# Patient Record
Sex: MALE | Race: WHITE | NOT HISPANIC OR LATINO | Employment: FULL TIME | URBAN - METROPOLITAN AREA
[De-identification: names, ages, dates, MRNs, and addresses within clinical notes are randomized per-mention and may not be internally consistent; named-entity substitution may affect disease eponyms.]

---

## 2024-03-03 PROCEDURE — 87186 SC STD MICRODIL/AGAR DIL: CPT

## 2024-03-03 PROCEDURE — 87086 URINE CULTURE/COLONY COUNT: CPT

## 2024-03-04 ENCOUNTER — LAB REQUISITION (OUTPATIENT)
Dept: LAB | Facility: HOSPITAL | Age: 54
End: 2024-03-04
Payer: COMMERCIAL

## 2024-03-04 DIAGNOSIS — N39.0 URINARY TRACT INFECTION, SITE NOT SPECIFIED: ICD-10-CM

## 2024-03-06 LAB — BACTERIA UR CULT: ABNORMAL

## 2024-03-07 ENCOUNTER — OFFICE VISIT (OUTPATIENT)
Dept: PRIMARY CARE | Facility: CLINIC | Age: 54
End: 2024-03-07
Payer: COMMERCIAL

## 2024-03-07 VITALS
OXYGEN SATURATION: 97 % | HEART RATE: 81 BPM | BODY MASS INDEX: 29.23 KG/M2 | SYSTOLIC BLOOD PRESSURE: 130 MMHG | DIASTOLIC BLOOD PRESSURE: 80 MMHG | TEMPERATURE: 97.6 F | WEIGHT: 200.8 LBS

## 2024-03-07 DIAGNOSIS — N39.0 URINARY TRACT INFECTION WITHOUT HEMATURIA, SITE UNSPECIFIED: Primary | ICD-10-CM

## 2024-03-07 PROCEDURE — 1036F TOBACCO NON-USER: CPT | Performed by: FAMILY MEDICINE

## 2024-03-07 PROCEDURE — 99213 OFFICE O/P EST LOW 20 MIN: CPT | Performed by: FAMILY MEDICINE

## 2024-03-07 RX ORDER — SULFAMETHOXAZOLE AND TRIMETHOPRIM 800; 160 MG/1; MG/1
1 TABLET ORAL 2 TIMES DAILY
Qty: 14 TABLET | Refills: 0 | Status: SHIPPED | OUTPATIENT
Start: 2024-03-07 | End: 2024-03-14

## 2024-03-07 RX ORDER — CIPROFLOXACIN 500 MG/1
500 TABLET ORAL 2 TIMES DAILY
COMMUNITY
Start: 2024-03-03 | End: 2024-03-13

## 2024-03-07 ASSESSMENT — PATIENT HEALTH QUESTIONNAIRE - PHQ9
1. LITTLE INTEREST OR PLEASURE IN DOING THINGS: NOT AT ALL
2. FEELING DOWN, DEPRESSED OR HOPELESS: NOT AT ALL
SUM OF ALL RESPONSES TO PHQ9 QUESTIONS 1 AND 2: 0

## 2024-03-07 ASSESSMENT — PAIN SCALES - GENERAL: PAINLEVEL: 0-NO PAIN

## 2024-03-07 NOTE — PROGRESS NOTES
Subjective   Patient ID: Brian Duane Keith is a 53 y.o. male who presents for Follow-up (Urgent care UTI 3-3-24).    HPI Here for follow-up to urgent care visit.  Patient was in her normal state of health up until about a week ago when he developed some urgency and frequency and pain with urination.  He went to an urgent care was told he had a urinary tract infection.  He was placed on Cipro.  He the medicine for 5 days continuously without without change in discomfort.  He is still having urgency and frequency.  The culture showed greater than 100,000 Proteus.    Review of Systems  Constitutional: Patient is negative for fever, fatigue, weight change.  HEENT: Patient is never change in vision, hearing, swallow.  Cardio: Patient is negative for chest pain, lower extremity edema.  Pulmonary: Patient is negative for cough, shortness of breath.  Genitourinary: Patient is positive for urgency and frequency.    Objective   /80   Pulse 81   Temp 36.4 °C (97.6 °F)   Wt 91.1 kg (200 lb 12.8 oz)   SpO2 97%   BMI 29.23 kg/m²     Physical Exam  General: Awake and alert no apparent distress.  HEENT: Moist oral mucosa no cervical lymphadenopathy.  Cardio: Heart S1-S2 no murmur rub or gallop.  Pulmonary: Lungs clear to auscultation bilaterally.  Assessment/Plan   Problem List Items Addressed This Visit    None  Visit Diagnoses         Codes    Urinary tract infection without hematuria, site unspecified    -  Primary needs better control.  Discontinue Cipro.  Begin TMP-SMX.  Patient to follow-up if no better in 5 days. N39.0    Relevant Medications    sulfamethoxazole-trimethoprim (Bactrim DS) 800-160 mg tablet

## 2025-05-16 ENCOUNTER — OFFICE VISIT (OUTPATIENT)
Dept: PRIMARY CARE | Facility: CLINIC | Age: 55
End: 2025-05-16
Payer: COMMERCIAL

## 2025-05-16 ENCOUNTER — TELEPHONE (OUTPATIENT)
Dept: PRIMARY CARE | Facility: CLINIC | Age: 55
End: 2025-05-16

## 2025-05-16 VITALS
SYSTOLIC BLOOD PRESSURE: 124 MMHG | DIASTOLIC BLOOD PRESSURE: 64 MMHG | BODY MASS INDEX: 28.63 KG/M2 | HEIGHT: 70 IN | WEIGHT: 200 LBS | OXYGEN SATURATION: 96 % | HEART RATE: 86 BPM | TEMPERATURE: 98 F

## 2025-05-16 DIAGNOSIS — J01.00 ACUTE NON-RECURRENT MAXILLARY SINUSITIS: Primary | ICD-10-CM

## 2025-05-16 DIAGNOSIS — Z13.220 LIPID SCREENING: ICD-10-CM

## 2025-05-16 DIAGNOSIS — Z00.00 WELL ADULT EXAM: ICD-10-CM

## 2025-05-16 DIAGNOSIS — R73.01 ABNORMAL FASTING GLUCOSE: ICD-10-CM

## 2025-05-16 PROBLEM — K21.9 GASTRO-ESOPHAGEAL REFLUX DISEASE WITHOUT ESOPHAGITIS: Status: ACTIVE | Noted: 2025-05-16

## 2025-05-16 PROBLEM — S36.113A LIVER LACERATION: Status: ACTIVE | Noted: 2018-11-29

## 2025-05-16 PROBLEM — R53.83 FATIGUE: Status: ACTIVE | Noted: 2019-06-21

## 2025-05-16 PROBLEM — K61.1 PERIRECTAL ABSCESS: Status: ACTIVE | Noted: 2021-04-12

## 2025-05-16 PROBLEM — M54.50 LOW BACK PAIN WITH RADIATION: Status: ACTIVE | Noted: 2020-08-10

## 2025-05-16 PROBLEM — R27.8 COORDINATION IMPAIRMENTS: Status: ACTIVE | Noted: 2019-04-03

## 2025-05-16 PROBLEM — M25.579 ARTHRALGIA OF ANKLE: Status: ACTIVE | Noted: 2022-07-05

## 2025-05-16 PROBLEM — R41.841 COGNITIVE COMMUNICATION DEFICIT: Chronic | Status: ACTIVE | Noted: 2019-04-03

## 2025-05-16 PROBLEM — F07.0 PERSONALITY CHANGE DUE TO HEAD INJURY: Status: ACTIVE | Noted: 2019-02-18

## 2025-05-16 PROBLEM — M25.571 RIGHT ANKLE PAIN: Status: ACTIVE | Noted: 2019-06-17

## 2025-05-16 PROBLEM — S22.43XD MULTIPLE CLOSED FRACTURES OF RIBS OF BOTH SIDES WITH ROUTINE HEALING: Status: ACTIVE | Noted: 2018-11-29

## 2025-05-16 PROBLEM — E66.3 OVERWEIGHT: Status: ACTIVE | Noted: 2021-05-24

## 2025-05-16 PROBLEM — S06.6XAA TRAUMATIC SUBARACHNOID HEMORRHAGE: Status: ACTIVE | Noted: 2018-11-19

## 2025-05-16 PROBLEM — M70.21 OLECRANON BURSITIS OF RIGHT ELBOW: Status: ACTIVE | Noted: 2021-07-16

## 2025-05-16 PROBLEM — E66.9 OBESITY: Status: ACTIVE | Noted: 2019-06-21

## 2025-05-16 PROBLEM — M51.26 HERNIATED LUMBAR DISC WITHOUT MYELOPATHY: Status: ACTIVE | Noted: 2020-08-13

## 2025-05-16 PROBLEM — S09.90XS PERSONALITY CHANGE DUE TO HEAD INJURY: Status: ACTIVE | Noted: 2019-02-18

## 2025-05-16 PROBLEM — S09.90XA INJURY OF HEAD: Status: ACTIVE | Noted: 2025-05-16

## 2025-05-16 PROCEDURE — 1036F TOBACCO NON-USER: CPT | Performed by: FAMILY MEDICINE

## 2025-05-16 PROCEDURE — 3008F BODY MASS INDEX DOCD: CPT | Performed by: FAMILY MEDICINE

## 2025-05-16 PROCEDURE — 99213 OFFICE O/P EST LOW 20 MIN: CPT | Performed by: FAMILY MEDICINE

## 2025-05-16 RX ORDER — AMOXICILLIN AND CLAVULANATE POTASSIUM 875; 125 MG/1; MG/1
875 TABLET, FILM COATED ORAL 2 TIMES DAILY
Qty: 20 TABLET | Refills: 0 | Status: SHIPPED | OUTPATIENT
Start: 2025-05-16 | End: 2025-05-26

## 2025-05-16 RX ORDER — PREGABALIN 75 MG/1
1 CAPSULE ORAL
COMMUNITY
Start: 2025-03-25

## 2025-05-16 RX ORDER — TRAMADOL HYDROCHLORIDE 50 MG/1
1 TABLET ORAL
COMMUNITY
Start: 2025-04-22

## 2025-05-16 ASSESSMENT — PATIENT HEALTH QUESTIONNAIRE - PHQ9
2. FEELING DOWN, DEPRESSED OR HOPELESS: NOT AT ALL
1. LITTLE INTEREST OR PLEASURE IN DOING THINGS: NOT AT ALL
SUM OF ALL RESPONSES TO PHQ9 QUESTIONS 1 AND 2: 0

## 2025-05-16 ASSESSMENT — PAIN SCALES - GENERAL: PAINLEVEL_OUTOF10: 0-NO PAIN

## 2025-05-16 NOTE — PROGRESS NOTES
"Subjective   Patient ID: Brian Duane Keith is a 54 y.o. male who presents for Sinus Problem (Congestion and post nasal discharge that is yellow in color.   Ears pop.  Irritated throat and sinus pressure.//Symptoms started 3-4 days ago) and Cough (Cough just to clear throat.  Does not feel any chest congestion.  No body aches.  No increased fatigue.).    HPI here with a 4-day history of facial pain and pressure with congestion and postnasal drip.  Patient works as a  and is in a significantly jose environment.  He has had a history of sinus infections in the past most recently about 2 years ago.  Patient does not use tobacco.  Patient does not have any known chronic respiratory disease.  No fever.    Review of Systems  Constitutional: Patient is negative for fever, fatigue, weight change.  HEENT: Patient is  positive for facial pain and pressure with postnasal drip and copious mucus.  He is negative for change in vision, hearing, swallow.  Cardio: Patient is negative for chest pain, lower extremity edema.  Pulmonary: Patient is negative for cough, shortness of breath.  Objective   /64 (BP Location: Left arm, Patient Position: Sitting)   Pulse 86   Temp 36.7 °C (98 °F)   Ht 1.778 m (5' 10\")   Wt 90.7 kg (200 lb)   SpO2 96%   BMI 28.70 kg/m²     Physical Exam  General: Awake and alert no apparent distress.  HEENT: Moist oral mucosa no cervical lymphadenopathy.  Tender to palpation over the maxillary sinuses bilaterally.  TMs with good cone light reflex.  Cardio: Heart S1-S2 no murmur rub or gallop.  Pulmonary: Lungs clear to auscultation bilaterally.    Assessment/Plan   Problem List Items Addressed This Visit    None  Visit Diagnoses         Codes      Acute non-recurrent maxillary sinusitis    -  Primary needs better control.  Begin antibiotics. J01.00    Relevant Medications    amoxicillin-clavulanate (Augmentin) 875-125 mg tablet      Well adult exam     Z00.00    Relevant Orders    Follow " Up In Primary Care - Health Maintenance

## 2025-07-15 DIAGNOSIS — Z13.220 LIPID SCREENING: ICD-10-CM

## 2025-07-15 DIAGNOSIS — R73.01 ABNORMAL FASTING GLUCOSE: ICD-10-CM

## 2025-07-15 DIAGNOSIS — Z00.00 WELL ADULT EXAM: ICD-10-CM

## 2025-08-15 ENCOUNTER — APPOINTMENT (OUTPATIENT)
Dept: PRIMARY CARE | Facility: CLINIC | Age: 55
End: 2025-08-15
Payer: COMMERCIAL

## 2025-08-15 VITALS
DIASTOLIC BLOOD PRESSURE: 80 MMHG | OXYGEN SATURATION: 98 % | HEIGHT: 70 IN | TEMPERATURE: 98.4 F | SYSTOLIC BLOOD PRESSURE: 130 MMHG | BODY MASS INDEX: 27.77 KG/M2 | WEIGHT: 194 LBS | HEART RATE: 77 BPM

## 2025-08-15 DIAGNOSIS — M54.41 CHRONIC RIGHT-SIDED LOW BACK PAIN WITH RIGHT-SIDED SCIATICA: ICD-10-CM

## 2025-08-15 DIAGNOSIS — Z87.891 HISTORY OF TOBACCO ABUSE: Primary | ICD-10-CM

## 2025-08-15 DIAGNOSIS — Z00.00 WELL ADULT EXAM: ICD-10-CM

## 2025-08-15 DIAGNOSIS — G89.29 CHRONIC RIGHT-SIDED LOW BACK PAIN WITH RIGHT-SIDED SCIATICA: ICD-10-CM

## 2025-08-15 RX ORDER — TESTOSTERONE CYPIONATE 200 MG/ML
INJECTION, SOLUTION INTRAMUSCULAR
COMMUNITY
Start: 2025-07-21

## 2025-08-15 ASSESSMENT — ENCOUNTER SYMPTOMS
DEPRESSION: 0
LOSS OF SENSATION IN FEET: 0
OCCASIONAL FEELINGS OF UNSTEADINESS: 0

## 2025-08-15 ASSESSMENT — PAIN SCALES - GENERAL: PAINLEVEL_OUTOF10: 6
